# Patient Record
Sex: MALE | Race: BLACK OR AFRICAN AMERICAN | NOT HISPANIC OR LATINO | URBAN - METROPOLITAN AREA
[De-identification: names, ages, dates, MRNs, and addresses within clinical notes are randomized per-mention and may not be internally consistent; named-entity substitution may affect disease eponyms.]

---

## 2019-04-01 RX ORDER — SODIUM, POTASSIUM,MAG SULFATES 17.5-3.13G
2 SOLUTION, RECONSTITUTED, ORAL ORAL TAKE AS DIRECTED
Qty: 354 ML | Refills: 0 | Status: SHIPPED | OUTPATIENT
Start: 2019-04-01

## 2019-04-08 ENCOUNTER — OUTSIDE FACILITY SERVICE (OUTPATIENT)
Dept: GASTROENTEROLOGY | Facility: CLINIC | Age: 49
End: 2019-04-08

## 2019-04-08 PROCEDURE — 45385 COLONOSCOPY W/LESION REMOVAL: CPT | Performed by: INTERNAL MEDICINE

## 2024-03-05 ENCOUNTER — OFFICE VISIT (OUTPATIENT)
Dept: NEUROLOGY | Facility: CLINIC | Age: 54
End: 2024-03-05
Payer: COMMERCIAL

## 2024-03-05 VITALS
OXYGEN SATURATION: 97 % | BODY MASS INDEX: 33.93 KG/M2 | HEART RATE: 66 BPM | WEIGHT: 216.2 LBS | TEMPERATURE: 97.7 F | SYSTOLIC BLOOD PRESSURE: 142 MMHG | HEIGHT: 67 IN | DIASTOLIC BLOOD PRESSURE: 86 MMHG

## 2024-03-05 DIAGNOSIS — Z86.73 HISTORY OF STROKE: Primary | ICD-10-CM

## 2024-03-05 RX ORDER — CLOPIDOGREL BISULFATE 75 MG/1
TABLET ORAL DAILY
COMMUNITY
Start: 2024-01-26

## 2024-03-05 RX ORDER — HYDROCHLOROTHIAZIDE 25 MG/1
25 TABLET ORAL DAILY
COMMUNITY
Start: 2024-01-26

## 2024-03-05 RX ORDER — AMLODIPINE BESYLATE 10 MG/1
10 TABLET ORAL DAILY
COMMUNITY

## 2024-03-05 RX ORDER — DIPHENHYDRAMINE HCL 12.5MG/5ML
1 LIQUID (ML) ORAL DAILY
COMMUNITY
Start: 2024-01-26

## 2024-03-05 RX ORDER — HYDROCHLOROTHIAZIDE 12.5 MG/1
12.5 TABLET ORAL DAILY
COMMUNITY
Start: 2024-01-15

## 2024-03-05 NOTE — PROGRESS NOTES
New Patient Office Visit      Encounter Date: 2024   Patient Name: Marques Sotelo  : 1970   MRN: [id number]   PCP: Jennifer Nayak MD    Referring Provider: Jennifer Nayak MD     Chief Complaint:    Chief Complaint   Patient presents with    Stroke     New patient       History of Present Illness: Marques Sotelo is a 54 y.o. male who is here today to establish care.  Patient was referred to the stroke clinic by his PCP due to abnormal MRI that was performed on an outpatient basis due to visual loss on the left.  MRI of the brain that was completed on 2024 showed a subacute infarct in the right occipital region compatible with a right PCA infarct as well as a chronic right frontal stroke.  Patient has had several other pieces of the stroke workup initiated in the outpatient basis including echocardiogram which was completed at [facility] which showed an EF of 60% no PFO and no LAE.  Patient also had a carotid ultrasound which shows bilateral carotid stenosis to be less than 50%.  Patient reports his PCP recently ordered an A1c and LDL but I do not have records of this.  I have asked him to bring these to her next appointment.  Patient reports he continues to have visual loss in the left visual field.  He reports everything looks hazy.  He is not driving at nighttime per instructions from his PCP.  I advised him no driving until seen by ophthalmologist and ideally getting a visual field mapping test.  He reports that he had an episode of neurologic symptoms last January that were transient.  He had a  vertigo-like episode that lasted approximately 30 minutes before resolving.  It is unclear whether this represents a vascular event or not.  Since finding out that he had had a subacute right occipital stroke he has been placed on dual antiplatelets with Plavix 75 mg,  mg and a high intensity statin of Lipitor 80 mg.  I am in agreement with this medication regimen until we have more  information to decide upon a long-term plan.  Patient has not had any intracranial imaging such as a CTA of the head and neck which I will order today.  I also feel due to his young age we should do a 14-day Holter monitor with the intention of longer monitoring if no obvious source of stroke is found.    Stroke Risk Factors: diabetes mellitus, hyperlipidemia, hypertension, and smoking      Subjective      Review of Systems:   Review of Systems   Constitutional:  Negative for activity change, appetite change, chills, diaphoresis, fatigue, fever, unexpected weight gain and unexpected weight loss.   Eyes:  Positive for visual disturbance. Negative for photophobia.   Respiratory:  Negative for cough and shortness of breath.    Cardiovascular:  Negative for chest pain, palpitations and leg swelling.   Gastrointestinal:  Negative for nausea and vomiting.   Musculoskeletal:  Negative for gait problem.   Neurological:  Negative for dizziness, tremors, seizures, syncope, facial asymmetry, speech difficulty, weakness, light-headedness, numbness, headache, memory problem and confusion.       Past Medical History:   Past Medical History:   Diagnosis Date    Diabetes mellitus 1/6/24    Pre-diabetic    Hyperlipidemia     Hypertension     Stroke 12/7/24    Mini stroke    Vision loss        Past Surgical History: History reviewed. No pertinent surgical history.    Family History:   Family History   Problem Relation Age of Onset    Stroke Mother        Social History:   Social History     Socioeconomic History    Marital status:    Tobacco Use    Smoking status: Former     Current packs/day: 1.00     Average packs/day: 1 pack/day for 5.0 years (5.0 ttl pk-yrs)     Types: Cigarettes     Passive exposure: Past    Smokeless tobacco: Never   Vaping Use    Vaping status: Never Used   Substance and Sexual Activity    Alcohol use: Not Currently     Alcohol/week: 1.0 standard drink of alcohol     Types: 1 Drinks containing 0.5 oz of  "alcohol per week    Drug use: Never    Sexual activity: Not Currently     Partners: Female     Birth control/protection: None       Medications:     Current Outpatient Medications:     amLODIPine (NORVASC) 10 MG tablet, Take 1 tablet by mouth Daily., Disp: , Rfl:     clopidogrel (PLAVIX) 75 MG tablet, Take  by mouth Daily., Disp: , Rfl:     [employer] Genuine Aspirin 325 MG tablet, Take 1 tablet by mouth Daily., Disp: , Rfl:     hydroCHLOROthiazide 12.5 MG tablet, Take 1 tablet by mouth Daily., Disp: , Rfl:     hydroCHLOROthiazide 25 MG tablet, Take 1 tablet by mouth Daily., Disp: , Rfl:     Allergies:   No Known Allergies    Objective     Physical Exam:  Vital Signs:   Vitals:    03/05/24 1001   BP: 142/86   Pulse: 66   Temp: 97.7 °F (36.5 °C)   TempSrc: Infrared   SpO2: 97%   Weight: 98.1 kg (216 lb 3.2 oz)   Height: 170.2 cm (67\")     Body mass index is 33.86 kg/m².     Physical Exam  Vitals and nursing note reviewed.   Constitutional:       General: He is awake. He is not in acute distress.     Appearance: Normal appearance. He is normal weight. He is not ill-appearing.   HENT:      Head: Normocephalic and atraumatic.      Nose: Nose normal.      Mouth/Throat:      Mouth: Mucous membranes are moist.   Eyes:      General: Lids are normal.      Extraocular Movements: Extraocular movements intact.      Pupils: Pupils are equal, round, and reactive to light.   Cardiovascular:      Rate and Rhythm: Normal rate and regular rhythm.      Pulses: Normal pulses.   Pulmonary:      Effort: Pulmonary effort is normal. No respiratory distress.   Skin:     General: Skin is warm and dry.   Neurological:      Mental Status: He is alert and oriented to person, place, and time.      Cranial Nerves: Cranial nerve deficit present.      Motor: Motor strength is normal.     Coordination: Coordination is intact.   Psychiatric:         Mood and Affect: Mood normal.         Speech: Speech normal.         Behavior: Behavior normal. "       Neurological Exam  Mental Status  Awake and alert. Oriented to person, place, time and situation. Oriented to person, place, and time. Speech is normal. Language is fluent with no aphasia. Attention and concentration are normal. Fund of knowledge is appropriate for level of education.    Cranial Nerves  CN II: Left homonymous hemianopsia.  CN III, IV, VI: Extraocular movements intact bilaterally. Normal lids and orbits bilaterally. Pupils equal round and reactive to light bilaterally.  CN V: Facial sensation is normal.  CN VII: Full and symmetric facial movement.  CN VIII: Hearing is normal to speech .  CN XI: Shoulder shrug strength is normal.  CN XII: Tongue midline without atrophy or fasciculations.    Motor  Normal muscle bulk throughout. No fasciculations present. Normal muscle tone. Strength is 5/5 throughout all four extremities.    Sensory  Light touch is normal in upper and lower extremities. Pinprick is normal in upper and lower extremities.     Coordination    Finger-to-nose, rapid alternating movements and heel-to-shin normal bilaterally without dysmetria.  No obvious dysmetria .    Gait  Casual gait is normal including stance, stride, and arm swing.     NIH Stroke Scale    1a  Level of consciousness: 0=alert; keenly responsive   1b. LOC questions:  0=Answers both questions correctly    1c. LOC commands: 0=Performs both tasks correctly   2.  Best Gaze: 0=normal   3. Visual: 1=Partial hemianopia   4. Facial Palsy: 0=Normal symmetric movement   5a. Motor left arm: 0=No drift, limb holds 90 (or 45) degrees for full 10 seconds   5b.  Motor right arm: 0=No drift, limb holds 90 (or 45) degrees for full 10 seconds   6a. Motor left le=No drift, limb holds 90 (or 45) degrees for full 10 seconds   6b  Motor right le=No drift, limb holds 90 (or 45) degrees for full 10 seconds   7. Limb Ataxia: 0=Absent   8.  Sensory: 0=Normal; no sensory loss   9. Best Language:  0=No aphasia, normal   10.  Dysarthria: 0=Normal   11. Extinction and Inattention: 0=No abnormality    Total:   1      Modified Pickett Score: 1, due to not driving at night otherwise able to do all activities        0  No Symptoms    1 No significant disability. Able to carry out all usual activities, despite some symptoms.    2 Slight disability. Able to look after own affairs without assistance, but unable to carry out all previous activities.    3 Moderate disability. Requires some help, but able to walk unassisted.    4 Moderately severe disability. Unable to attend to own bodily needs without assistance, and unable to walk unassisted.    5 Severe disability. Requires constant nursing care and attention, bedridden, incontinent.    6 Dead       PHQ-9 Depression Screening  Little interest or pleasure in doing things?     Feeling down, depressed, or hopeless?     Trouble falling or staying asleep, or sleeping too much?     Feeling tired or having little energy?     Poor appetite or overeating?     Feeling bad about yourself - or that you are a failure or have let yourself or your family down?     Trouble concentrating on things, such as reading the newspaper or watching television?     Moving or speaking so slowly that other people could have noticed? Or the opposite - being so fidgety or restless that you have been moving around a lot more than usual?     Thoughts that you would be better off dead, or of hurting yourself in some way?     PHQ-9 Total Score     If you checked off any problems, how difficult have these problems made it for you to do your work, take care of things at home, or get along with other people?       Imaging Reviewed:   MRI of the brain without contrast performed at [facility] on 1/24/2024: Reveals subacute infarct involving the right occipital lobe and the posterior right cerebellar artery territory.  The infarct may have acute on chronic component.  Particularly if the patient has worsening or new  neurologic symptoms.  For further evaluation MRA or CTA could be considered.  There is an additional area of remote infarct involving the right frontal lobe and cortex which is more chronic.  There is no evidence of intracranial hemorrhage.  There are additional flair changes in the cerebral white matter which are nonspecific in etiology but can be associated with small vessel ischemic disease.    Laboratory Results:   No laboratory values to review at this time.      Assessment / Plan      Assessment/Plan:     # History of stroke.  Subacute right occipital and chronic right frontal.  -Stroke found on outpatient MRI after patient reported left visual field cut x 3 weeks to his primary care provider  -Patient does have notable vascular risk factors of hypertension, hyperlipidemia, diabetes, obesity and prior history of smoking.  -Etiology of stroke is unknown at this time as we do not have enough information but likely could be small vessel disease related.  -Order CTA of the head and neck to evaluate vessels for atherosclerosis  -Carotid ultrasound reviewed with stenosis of less than 50% bilaterally  -TTE with no evidence of PFO or LAE  -14-day Holter ordered.  Depending on the results of the CTA of the head and neck and if the alternative etiology is found we will consider long-term cardiac monitoring if no A-fib found on short-term monitoring.  -Continue current medication regimen with DAPT and high intensity statin for now.  Will reevaluate at next appointment with results of further imaging.  -Normal BP goals  -Reviewed lifestyle modifications.  Patient has been making lifestyle modifications on his own as he has been improving his diet and increasing activity.  He has lost 17 pounds.  -Reviewed signs and symptoms of stroke and when to call 911 or return to the ED  -Follow-up in the stroke clinic in approximately 3 months.  Will call patient with results prior to this.    Discussed the importance of medication  compliance and lifestyle modifications (adequate blood pressure control, adequate control of hyperlipidemia, adequate glycemic control, increase physical activity, and healthy diet) to help reduce the risk of future cerebrovascular events.  Also discussed the signs symptoms that would warrant the patient return back to the emergency department including unilateral weakness, unilateral numbness, visual disturbances, loss of balance, speech difficulties, and/or a sudden severe headache.      Follow Up:   Return in about 3 months (around 6/5/2024).    ESTELA Martini  [facility] Neuro Stroke

## 2024-03-06 ENCOUNTER — PATIENT ROUNDING (BHMG ONLY) (OUTPATIENT)
Dept: NEUROLOGY | Facility: CLINIC | Age: 54
End: 2024-03-06
Payer: COMMERCIAL

## 2024-03-06 NOTE — PROGRESS NOTES
March 6, 2024    Hello, may I speak with Marques Sotelo?    My name is Jaimie Mayo      I am  with [facility]  [address] [facility] [address]  [place] [address] [phone].    Before we get started may I verify your date of birth? 1970    I am calling to officially welcome you to our practice and ask about your recent visit. Is this a good time to talk? yes    Tell me about your visit with us. What things went well?  Wade a few things and I t went well.       We're always looking for ways to make our patients' experiences even better. Do you have recommendations on ways we may improve?  no    Overall were you satisfied with your first visit to our practice? yes       I appreciate you taking the time to speak with me today. Is there anything else I can do for you? no      Thank you, and have a great day.

## 2024-03-26 ENCOUNTER — HOSPITAL ENCOUNTER (OUTPATIENT)
Dept: CT IMAGING | Facility: HOSPITAL | Age: 54
Discharge: HOME | End: 2024-03-26
Payer: COMMERCIAL

## 2024-03-26 DIAGNOSIS — Z86.73 HISTORY OF STROKE: ICD-10-CM

## 2024-03-26 LAB — CREAT BLDA-MCNC: 1.5 MG/DL (ref 0.6–1.3)

## 2024-03-26 PROCEDURE — 70498 CT ANGIOGRAPHY NECK: CPT

## 2024-03-26 PROCEDURE — 25510000001 IOPAMIDOL PER 1 ML: Performed by: NURSE PRACTITIONER

## 2024-03-26 PROCEDURE — 82565 ASSAY OF CREATININE: CPT

## 2024-03-26 PROCEDURE — 70496 CT ANGIOGRAPHY HEAD: CPT

## 2024-03-26 RX ADMIN — IOPAMIDOL 75 ML: 755 INJECTION, SOLUTION INTRAVENOUS at 08:59

## 2024-03-27 ENCOUNTER — TELEPHONE (OUTPATIENT)
Dept: NEUROLOGY | Facility: CLINIC | Age: 54
End: 2024-03-27
Payer: COMMERCIAL

## 2024-04-30 ENCOUNTER — HOSPITAL ENCOUNTER (OUTPATIENT)
Dept: CARDIOLOGY | Facility: HOSPITAL | Age: 54
Discharge: HOME | End: 2024-04-30
Payer: COMMERCIAL

## 2024-04-30 ENCOUNTER — TELEPHONE (OUTPATIENT)
Dept: NEUROLOGY | Facility: CLINIC | Age: 54
End: 2024-04-30
Payer: COMMERCIAL

## 2024-04-30 ENCOUNTER — OFFICE VISIT (OUTPATIENT)
Dept: CARDIOLOGY | Facility: HOSPITAL | Age: 54
End: 2024-04-30
Payer: COMMERCIAL

## 2024-04-30 VITALS
SYSTOLIC BLOOD PRESSURE: 141 MMHG | HEIGHT: 67 IN | BODY MASS INDEX: 33.68 KG/M2 | TEMPERATURE: 96.3 F | RESPIRATION RATE: 20 BRPM | HEART RATE: 51 BPM | DIASTOLIC BLOOD PRESSURE: 72 MMHG | WEIGHT: 214.6 LBS | OXYGEN SATURATION: 96 %

## 2024-04-30 DIAGNOSIS — I10 PRIMARY HYPERTENSION: ICD-10-CM

## 2024-04-30 DIAGNOSIS — I63.9 CEREBROVASCULAR ACCIDENT (CVA), UNSPECIFIED MECHANISM: ICD-10-CM

## 2024-04-30 DIAGNOSIS — I63.9 CEREBROVASCULAR ACCIDENT (CVA), UNSPECIFIED MECHANISM: Primary | ICD-10-CM

## 2024-04-30 DIAGNOSIS — I63.239 CAROTID STENOSIS, SYMPTOMATIC, WITH INFARCTION: ICD-10-CM

## 2024-04-30 RX ORDER — ATORVASTATIN CALCIUM 80 MG/1
80 TABLET, FILM COATED ORAL DAILY
COMMUNITY

## 2024-04-30 NOTE — PROGRESS NOTES
[facility] Heart Monitor Documentation    Marques Sotelo  1970  [id number]  04/30/24      [] ZIO XT Patch  Model [id number] Prescribed for  Days    Serial Number: (N + 9 Digits) N   Apply-By Date on Box:   USPS Tracking Number:   USPS Tracking        [x] [employer]ian MINI PLUS Mobile Cardiac Telemetry  Model BGMINIPLUS Prescribed for 30 Days    Serial Number: (BGM + 7 Digits) [id number]  Shipped-By Date on Box: 04/18/2024  UPS Tracking Number: [id number]  UPS Tracking      [] [employer] MINI Holter Monitor  Model BGMINIEL Prescribed for  Days    Serial Number: (7 Digits)   Shipped-By Date on Box:   UPS Tracking Number: [id number]  UPS Tracking        This monitor was applied to the patient's chest and checked for proper functioning.  Mr. Marques Sotelo was instructed in the proper use of this monitor.  He was given the opportunity to ask questions and left the office with the device 's instruction manual.    Julieth Sánchez CMA, 10:00 EDT, 04/30/24                  [facility] 8.8.2019

## 2024-04-30 NOTE — TELEPHONE ENCOUNTER
Previously had reached out to patient to discuss the results of his CTA of the head and neck but unfortunately was unable to contact him and had no voicemail to leave a message.  He was seen by ESTELA Fajardo in the cardiology clinic today who further discussed plan of care with myself and asked patient to have his cell phone available for a follow-up call with me this afternoon.  I have reviewed this case with Dr. Arce and we are both in agreement that both his proximal right M1 occlusion and right P1 occlusion corresponding to the noted late subacute and chronic infarcts on his MRI report from the outpatient diagnostic center.  It is unclear at this time the etiology of his stroke but could be from uncontrolled risk factors of hypertension, hyperlipidemia, diabetes, obesity and prior history of smoking.  We are also pursuing a 30-day Holter monitor to ensure there is no cardioembolic source of his occlusions.  Will maintain the patient on dual antiplatelet therapy and high intensity statin for now.  I have encouraged him to stay well-hydrated and avoid hypotension due to his occlusions.  At this time per Dr. Arce there is no need for a consultation with neurointervention as these are already completed strokes and no stenting would be possible in areas that are already occluded.  He will follow-up in the stroke clinic as previously scheduled on 6/5.

## 2024-04-30 NOTE — PROGRESS NOTES
"Chief Complaint  Hx of Stroke    Subjective      History of Present Illness {CC  Problem List  Visit  Diagnosis   Encounters  Notes  Medications  Labs  Result Review Imaging  Media :23}     Marques Sotelo, 54 y.o. male with history of recent CVA, diabetes mellitus, hyperlipidemia, hypertension, and smoking presents to [facility] Val[facility] clinic for Hx of Stroke.    Patient presents upon referral from neurology for evaluation after recently diagnosed with CVA. Stroke found on outpatient MRI after patient reported left visual field cut x 3 weeks to his primary care provider, subacute right occipital and chronic right frontal.  TTE completed at [facility] with no evidence of PFO, preserved LVEF.  CTA of neck with occlusion of the proximal M1 segment of the right middle cerebral artery with small collateral vessels reconstituting the right MCA M2 and more distal branch vessels. There is also occlusion of the P1 segment of the right posterior cerebral artery. More distally, there is reconstitution of a diminutive right PCA. Suspect chronic findings.  Patient was continued on DAPT, high intensity statin by neurology.    Presents today  with continued visual disturbance after recent CVA. Prior CVA occurred in November-December 2023. Regarding his cardiac symptoms he denies a history of palpitation/tachypalpitation, chest pain, shortness of breath. No family history of arrhythmia. SBP generally controlled in 130-140s on home monitoring, previously up to 190s. 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      Objective     Vital Signs:   Vitals:    04/30/24 0906 04/30/24 0907   BP: 130/71 141/72   BP Location: Left arm Left arm   Patient Position: Standing Sitting   Cuff Size: Large Adult Large Adult   Pulse: 57 51   Resp:  20   Temp: 96.3 °F (35.7 °C) 96.3 °F (35.7 °C)   TempSrc: Temporal Temporal   SpO2: 97% 96%   Weight:  97.3 kg (214 lb 9.6 oz)   Height:  170.2 cm (67\")     Body " mass index is 33.61 kg/m².  Physical Exam  Vitals and nursing note reviewed.   Constitutional:       Appearance: Normal appearance.   HENT:      Head: Normocephalic.   Eyes:      Extraocular Movements: Extraocular movements intact.   Neck:      Vascular: No carotid bruit.   Cardiovascular:      Rate and Rhythm: Normal rate and regular rhythm.      Pulses: Normal pulses.      Heart sounds: Normal heart sounds, S1 normal and S2 normal. No murmur heard.  Pulmonary:      Effort: Pulmonary effort is normal. No respiratory distress.      Breath sounds: Normal breath sounds.   Musculoskeletal:      Cervical back: Neck supple.      Right lower leg: No edema.      Left lower leg: No edema.   Skin:     General: Skin is warm and dry.   Neurological:      General: No focal deficit present.      Mental Status: He is alert.   Psychiatric:         Mood and Affect: Mood normal.         Behavior: Behavior normal.         Thought Content: Thought content normal.        Data Reviewed:{ Labs  Result Review  Imaging  Med Tab  Media :23}     CT Angiogram Neck (03/26/2024 09:05)  ECHO COMPLETE (DOPPLER / COLOR) W OR WO CONTRAST (01/31/2024 10:00)  US CAROTID BLOOD FLOW BILATERAL (01/31/2024 08:23)      Assessment & Plan   Assessment and Plan {CC Problem List  Visit Diagnosis  ROS  Review (Popup)  Health Maintenance  Quality  BestPractice  Medications  SmartSets  SnapShot Encounters  Media :23}     1. Cerebrovascular accident (CVA), unspecified mechanism  -Previously diagnosed with subacute right occipital and chronic right frontal  -TTE completed at [facility] with no evidence of PFO, preserved LVEF.    -CTA of neck with occlusion of the proximal M1 segment of the right middle cerebral artery with small collateral vessels reconstituting the right MCA M2 and more distal branch vessels.  -Continue ASA 325mg daily, clopidogrel 75mg daily, atorvastatin 80 Mg nightly  - Mobile Cardiac Outpatient Telemetry; Future  -  Ambulatory Referral to Cardiology  -Continue scheduled follow-up with neurology. Discussed recent imaging with stroke clinic, they will review next/head CT and follow-up with patient this afternoon.    2. Carotid stenosis  -Carotid ultrasound with stenosis of less than 50% bilaterally, mild to moderate plaque noted   -CTA neck/head as above  -Continue ASA 325mg daily, clopidogrel 75mg daily, atorvastatin 80 Mg nightly  -Will request most recent lab work/lipid panel from PCP  - Ambulatory Referral to Cardiology    3. Primary hypertension  -Reasonable control today  -Continue current medication regimen of amlodipine 10 Mg daily, HCTZ 25 Mg daily  -Will request recent lab work from PCP        Follow Up {Instructions Charge Capture  Follow-up Communications :23}     Return if symptoms worsen or fail to improve.      Patient was given instructions and counseling regarding his condition or for health maintenance advice. Please see specific information pulled into the AVS if appropriate.  Patient was instructed to call the [facility] Center with any questions, concerns, or worsening symptoms.    Dictated Utilizing Dragon Dictation   Please note that portions of this note were completed with a voice recognition program.   Part of this note may be an electronic transcription/translation of spoken language to printed text using the Dragon Dictation System.

## 2024-04-30 NOTE — PATIENT INSTRUCTIONS
- Heart monitor for 30 days    - Cardiologist group will call for appointment    - Continue current medications

## 2024-06-05 ENCOUNTER — OFFICE VISIT (OUTPATIENT)
Dept: NEUROLOGY | Facility: CLINIC | Age: 54
End: 2024-06-05
Payer: COMMERCIAL

## 2024-06-05 VITALS
SYSTOLIC BLOOD PRESSURE: 134 MMHG | HEART RATE: 63 BPM | HEIGHT: 67 IN | BODY MASS INDEX: 33.9 KG/M2 | OXYGEN SATURATION: 97 % | DIASTOLIC BLOOD PRESSURE: 84 MMHG | WEIGHT: 216 LBS | TEMPERATURE: 97.8 F

## 2024-06-05 DIAGNOSIS — Z86.73 HISTORY OF STROKE: Primary | ICD-10-CM

## 2024-06-05 DIAGNOSIS — Z91.89 AT RISK FOR SLEEP APNEA: ICD-10-CM

## 2024-06-05 NOTE — PROGRESS NOTES
V     Follow Up Office Visit      Encounter Date: 2024   Patient Name: Marques Sotelo  : 1970   MRN: [id number]   PCP: Jennifer Nayak MD    Chief Complaint:    Chief Complaint   Patient presents with    Follow-up    Stroke       History of Present Illness: Marques Sotelo is a 54 y.o. male who is here today to establish care.  Patient was referred to the stroke clinic by his PCP due to abnormal MRI that was performed on an outpatient basis due to visual loss on the left.  MRI of the brain that was completed on 2024 showed a subacute infarct in the right occipital region compatible with a right PCA infarct as well as a chronic right frontal stroke.  Patient has had several other pieces of the stroke workup initiated in the outpatient basis including echocardiogram which was completed at [facility] which showed an EF of 60% no PFO and no LAE.  Patient also had a carotid ultrasound which shows bilateral carotid stenosis to be less than 50%.  Patient reports his PCP recently ordered an A1c and LDL but I do not have records of this.  I have asked him to bring these to her next appointment.  Patient reports he continues to have visual loss in the left visual field.  He reports everything looks hazy.  He is not driving at nighttime per instructions from his PCP.  I advised him no driving until seen by ophthalmologist and ideally getting a visual field mapping test.  He reports that he had an episode of neurologic symptoms last January that were transient.  He had a  vertigo-like episode that lasted approximately 30 minutes before resolving.  It is unclear whether this represents a vascular event or not.  Since finding out that he had had a subacute right occipital stroke he has been placed on dual antiplatelets with Plavix 75 mg,  mg and a high intensity statin of Lipitor 80 mg.  I am in agreement with this medication regimen until we have more information to decide upon a long-term plan.   Patient has not had any intracranial imaging such as a CTA of the head and neck which I will order today.  I also feel due to his young age we should do a 14-day Holter monitor with the intention of longer monitoring if no obvious source of stroke is found.     Clinic visit 6/5/2024: Patient presents to clinic today for routine follow-up.  He reports that he has been doing well.  He has no new or worsening strokelike symptoms.  He reports some improvement in his left upper vision loss.  I inquired about if he is obtained clearance from a neuro-ophthalmologist.  He has not yet scheduled a visual field mapping but reports he will do so.  He has been following closely with his PCP to work on improving his blood pressure, cholesterol and blood sugar.  He has lost approximately 20 pounds through lifestyle changes.  He is interested in increasing his physical activity to include light weight lifting which I feel is appropriate.  He has been taking his aspirin, Plavix and Lipitor without issue or side effect.  He has just completed a 30-day cardiac monitor and mailed it back for review.  Report of the Holter monitor is still pending.  I will call the patient when I receive the results to discuss the findings.  We discussed if any evidence of atrial fibrillation is found we would initiate Eliquis 5 mg twice daily and likely discontinue Plavix and continue a baby aspirin.  Patient STOP-BANG score is a 6 so he will be referred to sleep medicine for KIMBERLEE evaluation.  He has no other questions or concerns at this time    Subjective        I have reviewed and the following portions of the patient's history were updated as appropriate: past family history, past medical history, past social history, past surgical history and problem list.    Medications:     Current Outpatient Medications:     amLODIPine (NORVASC) 10 MG tablet, Take 1 tablet by mouth Daily., Disp: , Rfl:     atorvastatin (LIPITOR) 80 MG tablet, Take 1 tablet by  "mouth Daily., Disp: , Rfl:     clopidogrel (PLAVIX) 75 MG tablet, Take  by mouth Daily., Disp: , Rfl:     [facility] Genuine Aspirin 325 MG tablet, Take 1 tablet by mouth Daily., Disp: , Rfl:     hydroCHLOROthiazide 25 MG tablet, Take 1 tablet by mouth Daily., Disp: , Rfl:     Allergies:   No Known Allergies    Objective     Physical Exam:   Vital Signs:   Vitals:    06/05/24 0906   BP: 134/84   Pulse: 63   Temp: 97.8 °F (36.6 °C)   SpO2: 97%   Weight: 98 kg (216 lb)   Height: 170.2 cm (67.01\")     Body mass index is 33.82 kg/m².    Physical Exam  Vitals and nursing note reviewed.   Constitutional:       General: He is awake. He is not in acute distress.     Appearance: Normal appearance. He is obese. He is not ill-appearing.      Comments: 54-year-old -American male   HENT:      Head: Normocephalic and atraumatic.      Nose: Nose normal.      Mouth/Throat:      Mouth: Mucous membranes are moist.   Eyes:      General: Lids are normal.      Extraocular Movements: Extraocular movements intact.      Pupils: Pupils are equal, round, and reactive to light.   Cardiovascular:      Rate and Rhythm: Normal rate and regular rhythm.      Pulses: Normal pulses.   Pulmonary:      Effort: Pulmonary effort is normal. No respiratory distress.   Skin:     General: Skin is warm and dry.   Neurological:      Mental Status: He is alert and oriented to person, place, and time.      Cranial Nerves: Cranial nerve deficit present.      Motor: Motor strength is normal.     Coordination: Coordination is intact.   Psychiatric:         Mood and Affect: Mood normal.         Speech: Speech normal.         Behavior: Behavior normal.       Neurological Exam  Mental Status  Awake and alert. Oriented to person, place, time and situation. Oriented to person, place, and time. Speech is normal. Language is fluent with no aphasia. Attention and concentration are normal. Fund of knowledge is appropriate for level of education.    Cranial Nerves  CN II: " Visual acuity is normal. Left superior quadrantanopsia.  CN III, IV, VI: Extraocular movements intact bilaterally. Normal lids and orbits bilaterally. Pupils equal round and reactive to light bilaterally.  CN V: Facial sensation is normal.  CN VII: Full and symmetric facial movement.  CN VIII: Hearing is normal to speech .  CN XI: Shoulder shrug strength is normal.  CN XII: Tongue midline without atrophy or fasciculations.    Motor  Normal muscle bulk throughout. No fasciculations present. Normal muscle tone. Strength is 5/5 throughout all four extremities.    Sensory  Light touch is normal in upper and lower extremities. Pinprick is normal in upper and lower extremities.     Coordination    Finger-to-nose, rapid alternating movements and heel-to-shin normal bilaterally without dysmetria.  No obvious dysmetria .    Gait  Casual gait is normal including stance, stride, and arm swing.     NIH 1    Modified [place] Score: 1        0  No Symptoms    1 No significant disability. Able to carry out all usual activities, despite some symptoms.    2 Slight disability. Able to look after own affairs without assistance, but unable to carry out all previous activities.    3 Moderate disability. Requires some help, but able to walk unassisted.    4 Moderately severe disability. Unable to attend to own bodily needs without assistance, and unable to walk unassisted.    5 Severe disability. Requires constant nursing care and attention, bedridden, incontinent.    6 Dead      PHQ-9 Depression Screening  Little interest or pleasure in doing things? 0-->not at all   Feeling down, depressed, or hopeless? 0-->not at all   Trouble falling or staying asleep, or sleeping too much?     Feeling tired or having little energy?     Poor appetite or overeating?     Feeling bad about yourself - or that you are a failure or have let yourself or your family down?     Trouble concentrating on things, such as reading the newspaper or watching  "television?     Moving or speaking so slowly that other people could have noticed? Or the opposite - being so fidgety or restless that you have been moving around a lot more than usual?     Thoughts that you would be better off dead, or of hurting yourself in some way?     PHQ-9 Total Score 0   If you checked off any problems, how difficult have these problems made it for you to do your work, take care of things at home, or get along with other people?         STOP-Bang Score  Have you been diagnosed with Sleep Apnea?: no  Snoring?: yes  Tired?: no  Observed?: yes  Pressure?: yes  Stop Score: 3  Body Mass Index more than 35 kg/m2?: no  Age older than 50 year old?: yes  Neck large? \">17\"/43cm-M, >16\"/41cm-F: yes  Gender=Male?: yes  Total Stop-Bang Score: 6       STEADI Fall Risk Clinician Key Questions   Have you fallen in the past year?: No  Do you feel unsteady with walking?: No  Are you worried about falling?: No      MRI of the brain without contrast performed [facility] on 1/24/2024: Reveals subacute infarct involving the right occipital lobe and the posterior right cerebellar artery territory.  The infarct may have acute on chronic component.  Particularly if the patient has worsening or new neurologic symptoms.  For further evaluation MRA or CTA could be considered.  There is an additional area of remote infarct involving the right frontal lobe and cortex which is more chronic.  There is no evidence of intracranial hemorrhage.  There are additional flair changes in the cerebral white matter which are nonspecific in etiology but can be associated with small vessel ischemic disease.         Assessment / Plan      Assessment/Plan:   # History of stroke.  Right occipital and chronic right frontal.  -Stroke found on outpatient MRI after patient after he reported left visual field cut x 3 weeks to his primary care provider.   -Patient does have notable vascular risk factors of hypertension, " hyperlipidemia, diabetes, obesity and prior history of smoking.  -A CTA of the head and neck was ordered to follow-up on MRI of the brain without contrast performed on 1/24/2024 that revealed a subacute (recent) infarct in the right occipital lobe in the territory of the right PCA.  There was also evidence of a chronic infarct in the right frontal lobe.  Both of these known strokes correspond to the occlusion of the proximal right M1 segment and P1 segment vessels which both reconstitute or show continued flow via small collateral vessels.  Likely etiology is plaque build from uncontrolled risk factors but also exploring cardioembolic etiologies   -Carotid ultrasound reviewed with stenosis of less than 50% bilaterally  -TTE with no evidence of PFO or LAE  -30-day Holter completed and monitor has been recently mailed back. Report pending. Will call patient with results.   -Continue current medication regimen with DAPT and high intensity statin for now.  Will reevaluate after we obtain results of holter   -Normal BP goals, <130/80. Today's /84. Patient's BP is much better controlled now compared to prior to his stroke  -STOP BANG 6. Sleep medicine referral placed for KIMBERLEE eval.   -A1C has improved from 6.7 to 6.1  - in January 2024. No new readings to review. Per his report his PCP has rechecked this and his cholesterol is now in normal range.   -Reviewed lifestyle modifications.  Patient has been making lifestyle modifications on his own as he has been improving his diet and increasing activity.  He has lost approximately 20 pounds. Encouraged patient to continue to make these changes.   -Reviewed signs and symptoms of stroke and when to call 911 or return to the ED  -Follow-up in the stroke clinic in approximately 6 months.  Will call patient with results prior to this.     Discussed the importance of medication compliance and lifestyle modifications (adequate blood pressure control, adequate control of  hyperlipidemia, adequate glycemic control, increase physical activity, and healthy diet) to help reduce the risk of future cerebrovascular events.  Also discussed the signs symptoms that would warrant the patient return back to the emergency department including unilateral weakness, unilateral numbness, visual disturbances, loss of balance, speech difficulties, and/or a sudden severe headache.       Follow Up:   Return in about 6 months (around 12/5/2024).    ESTELA Martini  [facility]

## 2024-06-07 ENCOUNTER — PATIENT ROUNDING (BHMG ONLY) (OUTPATIENT)
Dept: NEUROLOGY | Facility: CLINIC | Age: 54
End: 2024-06-07
Payer: COMMERCIAL

## 2024-06-07 ENCOUNTER — TELEPHONE (OUTPATIENT)
Dept: CARDIOLOGY | Facility: HOSPITAL | Age: 54
End: 2024-06-07
Payer: COMMERCIAL

## 2024-06-07 NOTE — TELEPHONE ENCOUNTER
30 day monitor placed on 4/30. No results yet however monitor just ended on 5/30. Spoke to patient and reminded him to return monitor if he hasn't already done so.

## 2024-06-07 NOTE — PROGRESS NOTES
LVM FOR PT TO CALL NEURO STROKE IF INTERESTED IN PROVIDING FEEDBACK IN REGARDS TO RECENT VISIT WITH THE CLINIC.

## 2024-06-24 ENCOUNTER — TELEPHONE (OUTPATIENT)
Dept: CARDIOLOGY | Facility: HOSPITAL | Age: 54
End: 2024-06-24
Payer: COMMERCIAL

## 2024-06-24 NOTE — TELEPHONE ENCOUNTER
----- Message from Hemanth Diana sent at 6/21/2024  5:06 PM EDT -----  Can you let Mr. Sotelo know:    Cardiac monitor with no atrial fibrillation, no abnormal rhythms reported. Continue planned follow-up with neurology and to establish with cardiology.     Thanks

## 2024-12-06 ENCOUNTER — OFFICE VISIT (OUTPATIENT)
Dept: NEUROLOGY | Facility: CLINIC | Age: 54
End: 2024-12-06
Payer: COMMERCIAL

## 2024-12-06 VITALS
BODY MASS INDEX: 32.96 KG/M2 | OXYGEN SATURATION: 97 % | HEIGHT: 67 IN | HEART RATE: 64 BPM | DIASTOLIC BLOOD PRESSURE: 68 MMHG | WEIGHT: 210 LBS | TEMPERATURE: 98.4 F | SYSTOLIC BLOOD PRESSURE: 122 MMHG

## 2024-12-06 DIAGNOSIS — Z86.73 HISTORY OF STROKE: Primary | ICD-10-CM

## 2024-12-06 NOTE — PROGRESS NOTES
Stroke Clinic Office Visit     Encounter Date: 2024   Patient Name: Marques Sotelo  : 1970  MRN: [id number]   PCP: Jennifer Nayak MD  Referring Provider: No ref. provider found     Chief Complaint Follow-up and Stroke  Marques Sotelo is a 54 y.o. right handed male here for stroke clinic follow up.    The patient has a history of right parietoccipital and right frontal ischemic stroke (), hypertension, hyperlipidemia and type 2 diabetes.  The patient states, he an episode of left visual loss, which lasted for a few seconds and resolved prior to having an MRI of the brain.    He is currently taking 325 mg ASA , 75 mg Plavix, and 80 mg atorvastatin for secondary stroke prevention without difficulty.    The patient arrives today with no complaints.  He continues to closely monitor stroke risk factors with his primary care physician.    He monitors his blood pressure at work, which typically measures around 137/80. 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 He also engages in regular exercise, including treadmill workouts and weightlifting. He ensures adequate hydration, consuming a significant amount of water daily. He recently underwent a physical examination and is awaiting the results.  He has a history of a blocked carotid artery, which has since rerouted itself. He has undergone a loop recorder procedure. He takes aspirin and Plavix as part of his medication regimen.  He reports experiencing cramps in his hands and feet, which have improved with increased water intake. He occasionally experiences leg cramps at night. He does not experience any muscle aches or bleeding.  He has experienced two episodes of vertigo, one of which occurred while he was bowling with his sister-in-law.  He has lost weight since his stroke. He reports a decrease in vision quality, despite being able to read lines during an  ophthalmologist visit. He experiences a persistent floater in his vision, which appears to move in a [place] and can obscure letters. This issue persists even when he closes one eye.  We discussed other stroke risk factors such as snoring; however, the patient reports he does not snore and often wakes up feeling refreshed.  He does not snore but reports feeling refreshed upon waking.  Patient denies any other neurological symptoms, including: headache, vision changes, dysesthesias, loss of consciousness, seizure or new areas of motor weakness.     Subjective     Past Medical History:   Diagnosis Date    Diabetes mellitus 1/6/24    Pre-diabetic    Hyperlipidemia     Hypertension     Stroke 12/7/24    Mini stroke    Vision loss       Past Surgical History:   Procedure Laterality Date    APPENDECTOMY      TENDON REPAIR Right     RIGHT ARM     Family History   Problem Relation Age of Onset    Hyperlipidemia Mother     Hypertension Mother     Stroke Mother     Gallbladder disease Mother     Thrombosis Mother     Hyperlipidemia Father     Hypertension Father     Cancer Father     Hernia Father     Cancer Maternal Aunt     Cancer Maternal Uncle     No Known Problems Maternal Grandmother     No Known Problems Maternal Grandfather     No Known Problems Paternal Grandmother     No Known Problems Paternal Grandfather       Social History     Socioeconomic History    Marital status:    Tobacco Use    Smoking status: Former     Current packs/day: 1.00     Average packs/day: 1 pack/day for 5.0 years (5.0 ttl pk-yrs)     Types: Cigarettes     Passive exposure: Past    Smokeless tobacco: Never   Vaping Use    Vaping status: Never Used   Substance and Sexual Activity    Alcohol use: Not Currently     Alcohol/week: 1.0 standard drink of alcohol     Types: 1 Drinks containing 0.5 oz of alcohol per week    Drug use: Never    Sexual activity: Not Currently     Partners: Female     Birth control/protection: None       Current  "Outpatient Medications:     amLODIPine (NORVASC) 10 MG tablet, Take 1 tablet by mouth Daily., Disp: , Rfl:     atorvastatin (LIPITOR) 80 MG tablet, Take 1 tablet by mouth Daily., Disp: , Rfl:     clopidogrel (PLAVIX) 75 MG tablet, Take  by mouth Daily., Disp: , Rfl:     [employer] Genuine Aspirin 325 MG tablet, Take 1 tablet by mouth Daily., Disp: , Rfl:     hydroCHLOROthiazide 25 MG tablet, Take 1 tablet by mouth Daily., Disp: , Rfl:    No Known Allergies     Objective     Physical Exam:  Vitals:    12/06/24 0912   BP: 122/68   Pulse: 64   Temp: 98.4 °F (36.9 °C)   SpO2: 97%   Weight: 95.3 kg (210 lb)   Height: 170.2 cm (67.01\")      Body mass index is 32.88 kg/m².     Physical Exam:  General Appearance: Alert  Eyes: Anicteric sclera  HEENT: no scleral injection   Lungs: respirations appear comfortable, no obvious increased work of breathing  Extremities: No cyanosis or fingernail clubbing   Skin: No rashes in exposed skin areas     Neurological Examination:   Mental status: Alert and oriented to person, place, and time. Speech with no dysarthria, able to name and repeat with no difficulty.    Cranial Nerves: Visual fields intact. Extraocular movements are intact with no nystagmus. Facial sensation intact. Face symmetrical. Hearing grossly intact. Palate movement is symmetric. Full shoulder shrug bilaterally. Tongue protrudes midline.   Sensory: Sensory exam to light touch in all four extremities distally is normal. Double simultaneous sensory stimulation shows no extinction  Motor: Normal tone throughout. Normal bulk. Pronator drift is absent.  Left upper extremity: 5/5 deltoid, tricep, bicep, interosseous, hand .   Right upper extremity: 5/5 deltoid, tricep, bicep, interosseous, hand .   Left lower extremity: 5/5 iliopsoas, knee extension/flexion, foot dorsi/plantarflexion.  Right lower extremity: 5/5 iliopsoas, knee extension/flexion, foot dorsi/plantarflexion.  Cerebellar: Finger-to-nose intact. Heel-to-shin " intact. Rapid alternating movements are intact.   Gait: Normal.         PHQ-9 Depression Screening  Little interest or pleasure in doing things? Not at all   Feeling down, depressed, or hopeless? Not at all   PHQ-2 Total Score 0   Trouble falling or staying asleep, or sleeping too much?     Feeling tired or having little energy?     Poor appetite or overeating?     Feeling bad about yourself - or that you are a failure or have let yourself or your family down?     Trouble concentrating on things, such as reading the newspaper or watching television?     Moving or speaking so slowly that other people could have noticed? Or the opposite - being so fidgety or restless that you have been moving around a lot more than usual?     Thoughts that you would be better off dead, or of hurting yourself in some way?     PHQ-9 Total Score     If you checked off any problems, how difficult have these problems made it for you to do your work, take care of things at home, or get along with other people?          ALLI Fall Risk Clinician Key Questions   Have you fallen in the past year?: No  Do you feel unsteady with walking?: No  Are you worried about falling?: No  Stay Idependant Patient Questions   Patient Fall Risk Assessment Score : 0  Fall Risk Category  Fall Risk Category: Low      Subjective     Past Medical History:   Diagnosis Date    Diabetes mellitus 1/6/24    Pre-diabetic    Hyperlipidemia     Hypertension     Stroke 12/7/24    Mini stroke    Vision loss       Past Surgical History:   Procedure Laterality Date    APPENDECTOMY      TENDON REPAIR Right     RIGHT ARM     Family History   Problem Relation Age of Onset    Hyperlipidemia Mother     Hypertension Mother     Stroke Mother     Gallbladder disease Mother     Thrombosis Mother     Hyperlipidemia Father     Hypertension Father     Cancer Father     Hernia Father     Cancer Maternal Aunt     Cancer Maternal Uncle     No Known Problems Maternal Grandmother     No  "Known Problems Maternal Grandfather     No Known Problems Paternal Grandmother     No Known Problems Paternal Grandfather       Social History     Socioeconomic History    Marital status:    Tobacco Use    Smoking status: Former     Current packs/day: 1.00     Average packs/day: 1 pack/day for 5.0 years (5.0 ttl pk-yrs)     Types: Cigarettes     Passive exposure: Past    Smokeless tobacco: Never   Vaping Use    Vaping status: Never Used   Substance and Sexual Activity    Alcohol use: Not Currently     Alcohol/week: 1.0 standard drink of alcohol     Types: 1 Drinks containing 0.5 oz of alcohol per week    Drug use: Never    Sexual activity: Not Currently     Partners: Female     Birth control/protection: None       Current Outpatient Medications:     amLODIPine (NORVASC) 10 MG tablet, Take 1 tablet by mouth Daily., Disp: , Rfl:     atorvastatin (LIPITOR) 80 MG tablet, Take 1 tablet by mouth Daily., Disp: , Rfl:     clopidogrel (PLAVIX) 75 MG tablet, Take  by mouth Daily., Disp: , Rfl:     [employer] Genuine Aspirin 325 MG tablet, Take 1 tablet by mouth Daily., Disp: , Rfl:     hydroCHLOROthiazide 25 MG tablet, Take 1 tablet by mouth Daily., Disp: , Rfl:    No Known Allergies     Objective     Physical Exam:  Vitals:    12/06/24 0912   BP: 122/68   Pulse: 64   Temp: 98.4 °F (36.9 °C)   SpO2: 97%   Weight: 95.3 kg (210 lb)   Height: 170.2 cm (67.01\")      Body mass index is 32.88 kg/m².     Physical Exam:  General Appearance: Alert  Eyes: Anicteric sclera  HEENT: no scleral injection   Lungs: respirations appear comfortable, no obvious increased work of breathing  Extremities: No cyanosis or fingernail clubbing   Skin: No rashes in exposed skin areas     Neurological Examination:   Mental status: Alert and oriented to person, place, and time. Speech with no dysarthria, able to name and repeat with no difficulty.    Cranial Nerves: Visual fields intact. Extraocular movements are intact with no nystagmus. Facial " sensation intact. Face symmetrical. Hearing grossly intact. Palate movement is symmetric. Full shoulder shrug bilaterally. Tongue protrudes midline.   Sensory: Sensory exam to light touch in all four extremities distally is normal. Double simultaneous sensory stimulation shows no extinction  Motor: Normal tone throughout. Normal bulk. Pronator drift is absent.  Left upper extremity: 5/5 deltoid, tricep, bicep, interosseous, hand .   Right upper extremity: 5/5 deltoid, tricep, bicep, interosseous, hand .   Left lower extremity: 5/5 iliopsoas, knee extension/flexion, foot dorsi/plantarflexion.  Right lower extremity: 5/5 iliopsoas, knee extension/flexion, foot dorsi/plantarflexion.  Cerebellar: Finger-to-nose intact. Heel-to-shin intact. Rapid alternating movements are intact.   Gait: Normal.    NIHSS:          PHQ-9 Depression Screening  Little interest or pleasure in doing things? Not at all   Feeling down, depressed, or hopeless? Not at all   PHQ-2 Total Score 0   Trouble falling or staying asleep, or sleeping too much?     Feeling tired or having little energy?     Poor appetite or overeating?     Feeling bad about yourself - or that you are a failure or have let yourself or your family down?     Trouble concentrating on things, such as reading the newspaper or watching television?     Moving or speaking so slowly that other people could have noticed? Or the opposite - being so fidgety or restless that you have been moving around a lot more than usual?     Thoughts that you would be better off dead, or of hurting yourself in some way?     PHQ-9 Total Score     If you checked off any problems, how difficult have these problems made it for you to do your work, take care of things at home, or get along with other people?          ALLI Fall Risk Clinician Key Questions   Have you fallen in the past year?: No  Do you feel unsteady with walking?: No  Are you worried about falling?: No  Stay Idependant Patient  "Questions   Patient Fall Risk Assessment Score : 0  Fall Risk Category  Fall Risk Category: Low    Laboratory Results:   No results found for: \"HGB\", \"HCT\", \"PLT\", \"HGBA1C\", \"LDL\", \"P2Y12\", \"AST\", \"ALT\"        Assessment / Plan      Assessment and Plan  Diagnoses and all orders for this visit:    1. History of stroke (Primary)  -     Duplex Carotid Ultrasound CAR; Future      -Conntinue 81 mg of aspirin   -Continue 80 mg of Atorvastatin  -Chair Yoga exercise 30 minutes 3 - 4 times a day  -Heart and Brain Healthy diet, Consider a Mediterranean Diet.   -Continue tight control of blood sugars  -Journal BP at home and call PCP with persistent BP >130/80  -Follow up in 1 Y  -The patient was advised to follow up with his primary care physician for ongoing management and screening for hypertension, hypercholesterolemia, and diabetes.   -The patient was counseled on long-term blood pressure goal less than 120/80, long-term LDL goal less than 70, and long-term hemoglobin A1c goal less than 7.0% for stroke prevention. This was also printed for the patient in the after visit summary.  -The patient was counseled he experiences symptoms of acute onset unilateral arm, leg, or face weakness/numbness/tingling, unilateral facial droop, slurred speech/word finding difficulty, visual disturbance (\"curtain falling\" or visual field loss), or severe headache he should call 911 and present to the nearest emergency department immediately.    I spent 30 minutes caring for Marques on this date of service. This time includes time spent by me in the following activities:reviewing tests, performing a medically appropriate examination and/or evaluation , counseling and educating the patient/family/caregiver, ordering medications, tests, or procedures, and documenting information in the medical record    Follow Up  Return in about 1 year (around 12/6/2025).    Patient or patient representative verbalized consent for the use of Ambient Listening " during the visit with  ESTELA Carranza for chart documentation. 12/10/2024  12:49 EST    12/10/2024  12:36 EST        ESTELA Carranza  [facility] [place] [place]     Part of this note may be an electronic transcription/translation of spoken language to printed text using the Dragon Dictation System.

## 2024-12-06 NOTE — PATIENT INSTRUCTIONS
-Continue 75 mg Plavix   -325 mg ASA  -Continue 80 mg of Atorvastatin, once daily.    -Do not take aspirin as an analgesic/pain relief while taking a daily low-dose aspirin for primary stroke prevention.    -Consider taking Tylenol, if appropriate.    -Consider contacting your primary care physician regarding pain.  -Exercise 30 minutes 3 - 4 times a day  -Heart and Brain Healthy diet, Consider a Mediterranean Diet.   -Continue tight control of blood sugars  -Journal BP at home and call PCP with persistent BP >140/90  -Follow up in 1 year